# Patient Record
Sex: FEMALE | Race: WHITE
[De-identification: names, ages, dates, MRNs, and addresses within clinical notes are randomized per-mention and may not be internally consistent; named-entity substitution may affect disease eponyms.]

---

## 2018-07-29 ENCOUNTER — HOSPITAL ENCOUNTER (INPATIENT)
Dept: HOSPITAL 12 - SRC | Age: 38
LOS: 6 days | Discharge: HOME | DRG: 895 | End: 2018-08-04
Attending: INTERNAL MEDICINE | Admitting: INTERNAL MEDICINE
Payer: COMMERCIAL

## 2018-07-29 VITALS — HEIGHT: 70 IN | WEIGHT: 208 LBS | BODY MASS INDEX: 29.78 KG/M2

## 2018-07-29 DIAGNOSIS — F13.230: Primary | ICD-10-CM

## 2018-07-29 DIAGNOSIS — Z91.19: ICD-10-CM

## 2018-07-29 DIAGNOSIS — F33.1: ICD-10-CM

## 2018-07-29 DIAGNOSIS — Z91.5: ICD-10-CM

## 2018-07-29 DIAGNOSIS — F10.230: ICD-10-CM

## 2018-07-29 DIAGNOSIS — Z82.49: ICD-10-CM

## 2018-07-29 DIAGNOSIS — F17.210: ICD-10-CM
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
## 2018-07-29 LAB
AMPHETAMINES UR QL SCN>1000 NG/ML: NEGATIVE
BARBITURATES UR QL SCN: NEGATIVE
COCAINE UR QL SCN: NEGATIVE
HCG UR QL: NEGATIVE
OPIATES UR QL SCN: NEGATIVE
PCP UR QL SCN>25 NG/ML: NEGATIVE
THC UR QL SCN>50 NG/ML: NEGATIVE

## 2018-07-29 PROCEDURE — HZ2ZZZZ DETOXIFICATION SERVICES FOR SUBSTANCE ABUSE TREATMENT: ICD-10-PCS | Performed by: INTERNAL MEDICINE

## 2018-07-29 PROCEDURE — A4663 DIALYSIS BLOOD PRESSURE CUFF: HCPCS

## 2018-07-29 PROCEDURE — G0480 DRUG TEST DEF 1-7 CLASSES: HCPCS

## 2018-07-29 NOTE — NUR
INTAKE ASSESSMENT

Pt presented as anxious,restless,emotionally disturbed,c/o "felling unwell"; reported that 
she has been drinking beer since she was 11 years old.She started drinking occasionally in 
small quantity and then over the years she began drinking more frequently in greater 
quantity and now she is drinking 8 bottles of beer,12 ozs each on a daily basis;last drink 
was at 1900 today.Pt also reports taking Xanax on a daily basis.She initially began taking 
Xanax 5 years ago when she was 32 years old,starting with smaller doses.She is currently 
taking Xanax 2.5mg on a daily basis,last taken 1 mg at 2000 today.She came in here today 
because she wants to stop drinking beer and taking Xanax.Pt is A/O X 4,speech is 
clear,ambulates with a steady gait and is in a stable condition to proceed to [facility].

B/P=112/66,T=97.6,P-106,R=18,O2 SAT=95% ON RA.

-------------------------------------------------------------------------------

Addendum: 07/30/18 at 0953 by MARINANE JACKSON RN

-------------------------------------------------------------------------------

This morning I asked the patient how long she has been drinking/using at the current rate 
and she stated as follows:



1. ETOH--8 12oz bottles of beer daily for the past six months. As stated above, she began 
drinking at 11, it increased over the years, she was sober for two years from the ages of 
[age] (without treatment), and 6 months ago progressed to the current rate. Last drink  was 
2 bottles of beer on 7/29 at 1900

2. Xanax--2.5 mg daily for the past six months. This medication is not prescribed and she 
has never had a prescription for Xanax. She states that she first used Xanax approximately 2 
years ago, starting at 1 mg/day, and it progressed to 2.5 mg/day six months ago. She has 
taken at least 1 mg of Xanax everyday for 2 years. As stated above, last use was 1 mg on 
7/29 at 2000. She began purchasing Xanax to help with her anxiety and panic attacks.

## 2018-07-29 NOTE — NUR
ADMISSION NOTE

HT=5 FEET, 10 INCHES.

XI=799 POUNDS.

B/P=112/66,T=97.8,P-106,R=18,O2 SAT=95% ON ROOM AIR.

CIWA=7

NKA



Admitting 36 y/o female to [facility] for medically supervised withdrawals from ETOH/BENZO.Pt 
presented as anxious,restless,emotionally disturbed,c/o "felling unwell"; reported that she 
has been drinking beer since she was 11 years old.She started drinking occasionally in small 
quantity and then over the years she began drinking more frequently in greater quantity and 
now she is drinking 8 bottles of beer,12 ozs each on a daily basis;last drink was at 1900 
today.Pt also reports taking Xanax on a daily basis.She initially began taking Xanax 5 years 
ago when she was 32 years old,starting with smaller doses.She is currently taking Xanax 
2.5mg on a daily basis,last taken 1 mg at 2000 today.

Pt stated that she has Hx of Major Depression and Anxiety.Pt is a  by profession and 
specializes in divorce cases,stated that her anxiety is due to her work.Pt reports that she 
has tried taking Prozac,Zoloft and Lexapro for her depression,stated" but nothing seems to 
work".Pt also has hx of G.I. 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 
and difficulty breathing when she first wakes up.Pt denies any hx of Asthma/SOB.No seizure 
hx noted.Pt is c/o headache 5/10 due to "HANG OVER" from drinking beer.

Skin is intact,warm and dry to touch,breathing is even and non labored,no s/s of respiratory 
distress noted, abdomen is soft and palpable with bowel sounds present in all four 
quadrants,no c/o nausea,vomiting of diarrhea noted.Pt had a bowel movement today.

Pt denies any SI/HI/AVH.Pt oriented to room and unit,care plan and safety checks 
initiated,education material provided.MD notified,orders obtained.



Pt is A/O X 4,lives in [place] by herself,stated that her friend helped her with this 
admission.She has never been in Detox treatment before,this is her first time ever.She does 
not have  PCP. Pt stated she was sober for 2 years between the ages of 21 to 23 from 
drinking alcohol and detoxed on her own.She stated that she has never stopped taking 
Xanax,since she started taking it when she was 32 years old;because she cannot survive 
without taking Xanax due to having high level of anxiety and has panic attacks when she does 
not take it.



PSYCH HX :-

Pt has history of suicide attempt x 2 by overdosing on alcohol and medications,in 2010.She 
was hospitalized  in [facility] x 2 in 2010 and placed on 5150 foe danger to 
self.



SUBS.ABUSE HX:

1) BEERS - Pt reports drinking 8 bottles of beer 12 ozs each on a daily basis.

    Last drink was 2 bottles at 1900 today.

2) XANAX - Pt reports taking 2.5 mg of Xanax daily.

   Last taken 1 mg at 2000 today.



WITHDRAWAL SYMPTOMS:-

Anxiety,restlessness,body twitching,tremors,hallucinations,nausea and insomnia.



NO PRIOR DETOX HX.



HOME MEDICATIONS

XANAX-2.5 MG DAILY FOR ANXIETY.

MOTRIN-400 MG AS NEEDED FOR PAIN.

IMODIUM- AS NEEDED FOR DIARRHEA.

## 2018-07-29 NOTE — NUR
PRN MEDICATIONS

PRN MOTRIN GIVEN AS ORDERED FOR C/O HEADACHE 5/10.PRN BENADRYL GIVEN FOR C/O INSOMNIA.WILL 
MONITOR FOR EFFECTIVENESS.

## 2018-07-30 VITALS — DIASTOLIC BLOOD PRESSURE: 63 MMHG | SYSTOLIC BLOOD PRESSURE: 95 MMHG

## 2018-07-30 VITALS — SYSTOLIC BLOOD PRESSURE: 109 MMHG | DIASTOLIC BLOOD PRESSURE: 71 MMHG

## 2018-07-30 VITALS — DIASTOLIC BLOOD PRESSURE: 91 MMHG | SYSTOLIC BLOOD PRESSURE: 132 MMHG

## 2018-07-30 VITALS — SYSTOLIC BLOOD PRESSURE: 132 MMHG | DIASTOLIC BLOOD PRESSURE: 90 MMHG

## 2018-07-30 VITALS — SYSTOLIC BLOOD PRESSURE: 119 MMHG | DIASTOLIC BLOOD PRESSURE: 88 MMHG

## 2018-07-30 VITALS — DIASTOLIC BLOOD PRESSURE: 61 MMHG | SYSTOLIC BLOOD PRESSURE: 98 MMHG

## 2018-07-30 LAB
ALP SERPL-CCNC: 53 U/L (ref 50–136)
ALT SERPL W/O P-5'-P-CCNC: 26 U/L (ref 14–59)
AMYLASE SERPL-CCNC: 34 U/L (ref 25–115)
AST SERPL-CCNC: 14 U/L (ref 15–37)
BASOPHILS # BLD AUTO: 0 K/UL (ref 0–8)
BASOPHILS NFR BLD AUTO: 0.4 % (ref 0–2)
BILIRUB SERPL-MCNC: 0.2 MG/DL (ref 0.2–1)
BUN SERPL-MCNC: 11 MG/DL (ref 7–18)
CHLORIDE SERPL-SCNC: 105 MMOL/L (ref 98–107)
CO2 SERPL-SCNC: 28 MMOL/L (ref 21–32)
CREAT SERPL-MCNC: 0.8 MG/DL (ref 0.6–1.3)
EOSINOPHIL # BLD AUTO: 0.3 K/UL (ref 0–0.7)
EOSINOPHIL NFR BLD AUTO: 3.7 % (ref 0–7)
ETHANOL SERPL-MCNC: 101 MG/DL (ref 0–0)
GLUCOSE SERPL-MCNC: 85 MG/DL (ref 74–106)
HCT VFR BLD AUTO: 42.9 % (ref 31.2–41.9)
HGB BLD-MCNC: 15 G/DL (ref 10.9–14.3)
LIPASE SERPL-CCNC: 135 U/L (ref 73–393)
LYMPHOCYTES # BLD AUTO: 2.3 K/UL (ref 20–40)
LYMPHOCYTES NFR BLD AUTO: 27.2 % (ref 20.5–51.5)
MAGNESIUM SERPL-MCNC: 1.7 MG/DL (ref 1.8–2.4)
MCH RBC QN AUTO: 33.2 UUG (ref 24.7–32.8)
MCHC RBC AUTO-ENTMCNC: 35 G/DL (ref 32.3–35.6)
MCV RBC AUTO: 95.3 FL (ref 75.5–95.3)
MONOCYTES # BLD AUTO: 0.5 K/UL (ref 2–10)
MONOCYTES NFR BLD AUTO: 6.3 % (ref 0–11)
NEUTROPHILS # BLD AUTO: 5.3 K/UL (ref 1.8–8.9)
NEUTROPHILS NFR BLD AUTO: 62.4 % (ref 38.5–71.5)
PLATELET # BLD AUTO: 276 K/UL (ref 179–408)
POTASSIUM SERPL-SCNC: 3.9 MMOL/L (ref 3.5–5.1)
RBC # BLD AUTO: 4.51 MIL/UL (ref 3.63–4.92)
WBC # BLD AUTO: 8.5 K/UL (ref 3.8–11.8)
WS STN SPEC: 6.8 G/DL (ref 6.4–8.2)

## 2018-07-30 PROCEDURE — HZ41ZZZ GROUP COUNSELING FOR SUBSTANCE ABUSE TREATMENT, BEHAVIORAL: ICD-10-PCS

## 2018-07-30 RX ADMIN — DIAZEPAM SCH MG: 10 TABLET ORAL at 15:07

## 2018-07-30 RX ADMIN — Medication SCH MG: at 16:28

## 2018-07-30 RX ADMIN — DIAZEPAM SCH MG: 10 TABLET ORAL at 21:12

## 2018-07-30 RX ADMIN — TRAZODONE HYDROCHLORIDE SCH MG: 50 TABLET ORAL at 21:12

## 2018-07-30 RX ADMIN — FOLIC ACID SCH MG: 1 TABLET ORAL at 08:52

## 2018-07-30 RX ADMIN — DIAZEPAM SCH MG: 10 TABLET ORAL at 16:28

## 2018-07-30 RX ADMIN — THERA TABS SCH UDTAB: TAB at 08:52

## 2018-07-30 RX ADMIN — Medication SCH MG: at 08:52

## 2018-07-30 NOTE — NUR
START OF SHIFT NOTE

Received report from night nurse, 37 year old female admitted for ETOH/Xanax withdrawal. 
Patient currently not on any taper but PRN'S available for s/s of withdrawal. Per 
endorsement patient received PRN Motrin, Benadryl, Ativan effective per night nurse, Last 
CIWA -7, slept for 7 hours. Received patient alert awake poor eye contact, appears flushed 
and bilateral hand tremors, sweats, anxious, agitated, restless, easily distracted, unkempt 
room. Educated patient regarding the importance of attending groups activities. Patient 
verbalized understanding. All safety measures in place, Call light within reach. Will cont 
to monitor.

## 2018-07-30 NOTE — NUR
START OF SHIFT

Pt is a 38 y/o female admitted  to [facility] for medically supervised withdrawals from 
ETOH/BENZO.Pt received in room,c/o feeling  anxious,restless and  not feeling well. Pt also 
c/o intermittent nausea and constipation but does not want to take any medications at this 
time,stated I will go to smoke and will be okay.Pt has been started on a Valium taper and 
has been tolerating well.Wants to wait for scheduled medications.No A/R noted. No PRN meds 
given during the day. Last CIWA 12. All safety measures in place.  Bed is locked in  lowest 
position with side rails x2 up for safety. Call light is  within reach, will continue to 
monitor for safety.

## 2018-07-30 NOTE — NUR
PRN REASSESSMENT.

Pt is calm and resting in bed with eyes closed;breathing is even and non labored,no s/s of 
distress noted;all safety measures in place,call light is within reach,will continue to 
monitor.

## 2018-07-30 NOTE — NUR
COWS=9.

Pt is c/o feeling tremulous,anxious,restless,c/o having mild nausea but no vomiting 
reported;refused to take zofran when offered.Pt  feels like she does not belong in 
here.Emotional support and positive coping skills encouraged.Pt stated that her job is very 
stressful and gives her a lot of anxiety.Pt plans to join a gym and  go to an out patient 
clinic after discharge;will continue to monitor.

## 2018-07-30 NOTE — NUR
CIWA ASSESSMENT

CIWA score noted-10, Patient presented with anxiety, agitation, restless, bilateral hand 
tremors,encourage patient to use non pharmacological intervention. Will cont to monitor.

## 2018-07-30 NOTE — NUR
END OF SHIFT NOTE

Gave report to night nurse, 37 year old female admitted for ETOH/Xanax withdrawal and 
continues and started Valium taper this afternoon tolerated well. Patient continues to 
exhibit anxiety, agitation, restless, anhedonia, sweats, sad facial expression, worried, 
fine bilateral hand tremors noted, depressed. Patient received scheduled medications and 
patient was seen by psychiatrist with new order to give Prozac 20mg PO medication 
administered as ordered patient tolerated well.  During shift patient received scheduled 
medications and PPD was given on LFA no s/s of bleeding no swelling noted. Encourage patient 
to attend groups activities to learn new coping skills. Most recent CIWA score-12. All 
safety measures in place, call light within reach. Patient endorse to night nurse in stable 
condition.

## 2018-07-30 NOTE — NUR
CIWA deferred due to Pt being asleep;no s/s of distress noted,all safety measures in 
place,will continue to monitor.

## 2018-07-30 NOTE — NUR
END OF SHIFT

Pt is a 36 y/o female admitted  to [facility] for medically supervised withdrawals from 
ETOH/BENZO.Pt presented as feeling anxious,restless,emotionally disturbed,c/o "felling 
unwell",began crying during interview,stated that she is having and anxiety attack; also c/o 
headache due to hangover and cannot sleep at night.Pt was given Ativan 2 mg PO as 
ordered,PRN Motrin for headache and Benadryl for insomnia.Medications were effective in 
controlling symptoms.Pt slept 7 hrs,fluid intake was 1000 mls,voided x 2.Last CIWA was 7. 
All safety measures in place.  Bed is locked in  lowest position with side rails x2 up for 
safety. Call light within reach, will endorse care to on coming shift nurse.

## 2018-07-30 NOTE — NUR
CIWA ASSESSMENT

CIWA score 12, patient resting in her room, reported  anxiety, agitation, restless, pines 
and needles, sweats. Will cont to monitor.

## 2018-07-30 NOTE — NUR
CIWA ASSESSMENT

CIWA score-13, Patient continues to exhibits s/s of withdrawal anxiety, agitation, restless, 
bilateral hand tremors, anhedonia, Patient scheduled to start for Valium taper. Will cont to 
monitor.

## 2018-07-31 VITALS — SYSTOLIC BLOOD PRESSURE: 107 MMHG | DIASTOLIC BLOOD PRESSURE: 71 MMHG

## 2018-07-31 VITALS — DIASTOLIC BLOOD PRESSURE: 68 MMHG | SYSTOLIC BLOOD PRESSURE: 99 MMHG

## 2018-07-31 VITALS — SYSTOLIC BLOOD PRESSURE: 150 MMHG | DIASTOLIC BLOOD PRESSURE: 86 MMHG

## 2018-07-31 VITALS — SYSTOLIC BLOOD PRESSURE: 130 MMHG | DIASTOLIC BLOOD PRESSURE: 94 MMHG

## 2018-07-31 VITALS — SYSTOLIC BLOOD PRESSURE: 126 MMHG | DIASTOLIC BLOOD PRESSURE: 88 MMHG

## 2018-07-31 VITALS — DIASTOLIC BLOOD PRESSURE: 66 MMHG | SYSTOLIC BLOOD PRESSURE: 108 MMHG

## 2018-07-31 PROCEDURE — HZ31ZZZ INDIVIDUAL COUNSELING FOR SUBSTANCE ABUSE TREATMENT, BEHAVIORAL: ICD-10-PCS

## 2018-07-31 RX ADMIN — DIAZEPAM SCH MG: 10 TABLET ORAL at 08:12

## 2018-07-31 RX ADMIN — CLONIDINE HYDROCHLORIDE PRN MG: 0.1 TABLET ORAL at 20:38

## 2018-07-31 RX ADMIN — DIAZEPAM SCH MG: 10 TABLET ORAL at 20:38

## 2018-07-31 RX ADMIN — TRAZODONE HYDROCHLORIDE SCH MG: 50 TABLET ORAL at 20:38

## 2018-07-31 RX ADMIN — DICYCLOMINE HYDROCHLORIDE PRN MG: 20 TABLET ORAL at 13:47

## 2018-07-31 RX ADMIN — Medication SCH MG: at 08:12

## 2018-07-31 RX ADMIN — THERA TABS SCH UDTAB: TAB at 08:12

## 2018-07-31 RX ADMIN — DIAZEPAM SCH MG: 10 TABLET ORAL at 14:00

## 2018-07-31 RX ADMIN — FOLIC ACID SCH MG: 1 TABLET ORAL at 08:12

## 2018-07-31 NOTE — NUR
CIWA 14



Pt presents with anxiety, restlessness, racing thoughts, elevated BP, elevated HR, tremors, 
sweats, flushed skin, agitation, difficulty concentrating, disheveled appearance, difficulty 
falling and staying asleep, and is fidgety. Pt states, "My anxiety is so high right now, I 
need to go smoke right now."

## 2018-07-31 NOTE — NUR
PRN CLONIDINE ADMINISTRATION



Pt presents with anxiety, agitation, sweats and /88 and HR 92. Safety measures in 
place. Call light within reach. Will continue to monitor.

## 2018-07-31 NOTE — NUR
END OF SHIFT NOTE

Gave report to night nurse, patient continues with Valium taper tolerating well. Patient 
presented with anxiety, agitation, restless, anhedonia, stomach cramps, bilateral hand 
tremors. Patient noted with flat affect. Patient was given schedule medications along with 
PRN Bentyl for stomach cramps noted to be effective. Encourage pt to attend group activities 
to learn new coping skills. Encourage PO fluids as tolerated. All safety measures in place, 
Call light within reach. Patient endorsed to night nurse in stable condition.

## 2018-07-31 NOTE — NUR
CIWA ASSESSMENT

CIWA score noted 14, Patient continues to exhibit s/s of  with anxiety, agitation, restless, 
 sweats, light headed stomach cramps. Will cont to monitor.

## 2018-07-31 NOTE — NUR
START OF SHIFT NOTE

Received report from night nurse, 37 year old female admitted for ETOH/Xanax withdrawal. 
Patient continues on Valium taper tolerating well. Per endorsement patient did not receive 
any PRN , Last CIWA -9, slept for 7 hours. Received patient alert awake poor eye contact, 
appears flushed and bilateral hand tremors, sweats, anxious, agitated, restless. Educated 
patient regarding the importance of attending groups activities. Patient verbalized 
understanding. All safety measures in place, Call light within reach. Will cont to monitor.

## 2018-07-31 NOTE — NUR
CIWA DEFERRED

CIWA deferred due to Pt being asleep;no s/s of distress noted,all safety measures in 
place,V/S are stable;will continue to monitor.

## 2018-07-31 NOTE — NUR
START OF SHIFT



Pt is a 38 y/o female admitted on 07/29/18 for ETOH and benzo withdrawal. Pt is on a 5 day 
Valium taper that started on 07/30/18, tolerating well. Last CIWA 13 and PRN Bentyl 
administered during day shift. Upon assessment pt presents with anxiety, restlessness, 
racing thoughts, elevated BP, elevated HR, tremors, sweats, flushed skin, agitation, 
difficulty concentrating, disheveled appearance, difficulty falling and staying asleep, and 
is fidgety. Medications due. Safety measures in place. Call light within reach. Will 
continue to monitor.

## 2018-07-31 NOTE — NUR
CIWA ASSESSMENT

CIWA score noted 12, Patient presented with anxiety, agitation, restless, anhedonia, sweats, 
light headed. patient due for schedule medications. Will cont to monitor.

## 2018-07-31 NOTE — NUR
PRN BENTYL

Patient reported stomach cramps 5/10. PRN Bentyl 20mg PO administered as ordered. Will cont 
to monitor and reassess.

## 2018-07-31 NOTE — NUR
PRN CLONIDINE REASSESSMENT



Pt has improvement in anxiety, sweats and agitation. Pt still has anxiety and restlessness, 
but has overall improvement in symptoms. Safety measures in place. Call light within reach. 
Will continue to monitor.

## 2018-08-01 VITALS — SYSTOLIC BLOOD PRESSURE: 101 MMHG | DIASTOLIC BLOOD PRESSURE: 74 MMHG

## 2018-08-01 VITALS — SYSTOLIC BLOOD PRESSURE: 126 MMHG | DIASTOLIC BLOOD PRESSURE: 79 MMHG

## 2018-08-01 VITALS — SYSTOLIC BLOOD PRESSURE: 138 MMHG | DIASTOLIC BLOOD PRESSURE: 97 MMHG

## 2018-08-01 VITALS — SYSTOLIC BLOOD PRESSURE: 92 MMHG | DIASTOLIC BLOOD PRESSURE: 66 MMHG

## 2018-08-01 VITALS — DIASTOLIC BLOOD PRESSURE: 74 MMHG | SYSTOLIC BLOOD PRESSURE: 110 MMHG

## 2018-08-01 RX ADMIN — Medication SCH MG: at 08:37

## 2018-08-01 RX ADMIN — DIAZEPAM SCH MG: 5 TABLET ORAL at 21:07

## 2018-08-01 RX ADMIN — FOLIC ACID SCH MG: 1 TABLET ORAL at 08:37

## 2018-08-01 RX ADMIN — THERA TABS SCH UDTAB: TAB at 08:37

## 2018-08-01 RX ADMIN — TRAZODONE HYDROCHLORIDE SCH MG: 50 TABLET ORAL at 21:07

## 2018-08-01 RX ADMIN — DIAZEPAM SCH MG: 5 TABLET ORAL at 08:38

## 2018-08-01 RX ADMIN — DIAZEPAM SCH MG: 5 TABLET ORAL at 16:54

## 2018-08-01 RX ADMIN — ALUMINUM HYDROXIDE, MAGNESIUM HYDROXIDE, AND SIMETHICONE PRN ML: 200; 200; 20 SUSPENSION ORAL at 15:18

## 2018-08-01 RX ADMIN — DIAZEPAM SCH MG: 5 TABLET ORAL at 12:33

## 2018-08-01 NOTE — NUR
CIWA ASSESSMENT

continues to exhibit the following s/sx of withdrawal:dyspepsia, tremors,  anxiety, 
agitation, fidgety, restless, depression, difficulty concentrating, generalized discomfort, 
diaphoresis, increased emotional amplitude, intermittent perspiration, and sensitivity to 
sound. Patient with CIWA score of: 17, Dr. Hernandez aware, no new others, will continue to 
monitor.

## 2018-08-01 NOTE — NUR
Clinical Therapy Note:

Met with pt. briefly after report in IDT that pt. was refusing to call her employer and 
could be faced with abandoning her position as an . Patient acknowledged that she " 
really does not want to do it but realizes she has to call her employer." Jadyn and this 
writer spoke with the client briefly and Jadyn agreed to facilitate this this evening. 
Client was agreeable. Kathryn MCCLURE was also made aware of the situation.

## 2018-08-01 NOTE — NUR
CIWA DEFERRED AND VITALS REFUSED



Pt laying in bed with eyes closed, CIWA deferred, to be assessed when pt is awake per 
orders. Vitals refused. Respirations even and unlabored. Safety measures in place. Call 
light within reach. Will continue to monitor.

## 2018-08-01 NOTE — NUR
CIWA ASSESSMENT:

CIWA=14 at 2000: Patient presented with moderate withdrawal symptoms of anxiety, agitation, 
nervousness, irritability, flushed face, sweating, tremors, yawning, piloerection, 
restlessness, and fatigue.  The patient is alert and oriented x4, ambulatory with steady 
gait, cooperative with soft, clear speech.   Affect irritable and anxious.  Mood labile.

## 2018-08-01 NOTE — NUR
CIWA ASSESSMENT

Patient in room, noted with flat affect, depressed and anxious mood. Patient presenting 
with: tremors,  anxiety, agitation, fidgety, restless, depression, diaphoresis, difficulty 
concentrating, generalized discomfort, increased emotional amplitude, intermittent 
perspiration, and sensitivity to sound. Patient with CIWA score of: 17, Dr. Hernandez aware, no 
new others, patient continues on 5 day Valium taper as ordered, encouraged patient to 
increase PO fluid intake as tolerated. Will continue to monitor.

## 2018-08-01 NOTE — NUR
CIWA 12



Pt presents with anxiety, restlessness, intermittent tremors, sweats, flushed skin, 
agitation, disheveled appearance, difficulty falling and staying asleep. Pt states, "I feel 
wide awake."

## 2018-08-01 NOTE — NUR
END OF SHIFT



Pt is a 38 y/o female admitted on 07/29/18 for ETOH and benzo withdrawal. Pt is on a 5 day 
Valium taper that started on 07/30/18, tolerating well. Pt presented with anxiety, 
restlessness, racing thoughts, elevated BP, elevated HR, tremors, sweats, flushed skin, 
agitation, difficulty concentrating, disheveled appearance, difficulty falling and staying 
asleep, and was fidgety. Scheduled medications and PRN Clonidine and Benadryl administered, 
effective in S/S of withdrawal AEB CIWA 14 lowered to CIWA 12 during shift.  Pt slept 7 
hours. Intake 2000 ml, void x 5, stool x 1. Safety measures in place. Call light within 
reach. Pts needs have been met. Endorsed to day shift nurse.

## 2018-08-01 NOTE — NUR
BEGINNING OF SHIFT

Patient endorsement report received from night shift nurse, all pertinent information was 
discussed. Patient is a 37 year old female admitted with admitting Dx: Etoh/BZO withdrawal. 
patient remains under close observation, will continue to monitor patient closely throughout 
out shift, will monitor s/sx of withdrawal,  currently with ongoing 5 day Valium taper as 
ordered. patient is scheduled to begin day 3 of taper. Patient received PRN: clonidine, and 
Benadryl as per night shift medications effective. patient slept for 7 hours. Patient with 
last CIWA score of: 12. Patient received awake, alert and oriented x4, educated regarding 
plan of care for the day and medication regimen. safety measures are in place. call light 
with in reach. will continue to monitor closely.

## 2018-08-01 NOTE — NUR
START OF SHIFT NOTE:

Endorsed patient is a 37 year old female admitted for Benzodiazepines(Xanax) and 
Alcohol(Beer) withdrawal.   She is on  ordered 5 day Valium taper.  Patent reports NKA, is 
on Full Code, Regular Diet, is on Fall and Seizures Precautions.  Patient denies History of 
withdrawal-induced seizures.  Patient denies SI/HI.  Patient is alert and oriented x4 with 
stable gait.  She is appears sad and worry with poor eye contact,  anxious mood and flat 
affect.  Patient 's c/o feelings of easily overwhelmed, increased anxiety, and irritability. 
 She expressed her feelings as," I am easily irritated, and have restlessness and fatigue".  
Emotional support and reassuring provided.  Encouraged expressed her feelings.  Last CIWA=17 
@1645 per outgoing day shift nurse report:  Patient  presented with moderate withdrawal 
symptoms of anxiety, agitation, nervousness, tremors, sweating, generalized body aches, 
myalgia, heartburn, headache, restlessness, and fatigue.  PRN  Mylanta administrated for 
heartburn at 1518, and was effective, per day shift nurse report.  Patient remains compliant 
with treatment, medications and diet regime.  Encouraged to fluid intake as tolerated.  
Encouraged to attended groups activities.  All needs met.  Safety measures in place: Call 
light within reach, bed is locked in lowest position, padded bed rails up bilaterally.  
Patient endorsed by day shift nurse.  Report received.  Will continue to monitor closely.

## 2018-08-01 NOTE — NUR
RE-ASSESSMENT

Patient is sleeping.  RR: 16.  Respirations even and unlabored.  Benadryl 50 mg PO 
administrated PRN for insomnia at 2107 was effective.  Safe and calm environment with 
minimized noises was provided.  All needs met.  Safety measures: Call light within reach, 
bed in lowest position locked, padded rails up bilaterally.  Will continue to monitor 
closely.

## 2018-08-01 NOTE — NUR
PRN  BENADRYL 50 MG 1 CAP PO ADMINISTRATION

Patient c/o insomnia and asked aid.   Benadryl 50 mg PO administrated PRN with full glass of 
water as ordered.  Patient tolerated well.   All needs met.  Safe and calm environment with 
minimized noises was provided.  Safety measures on place.  Call light within reach, bed in 
lowest position locked, padded rails up bilaterally.  Will continue to monitor closely.

## 2018-08-01 NOTE — NUR
PRN BENADRYL REASSESSMENT



Pt laying in bed with eyes closed, medication noted effective. Respirations even and 
unlabored. Safety measures in place. Call light within reach. Will continue to monitor.

## 2018-08-01 NOTE — NUR
END OF SHIFT

Patient monitored closely during shift, continues with ongoing 5 day Valium taper as 
ordered, currently on day 3 of taper. During shift patient presented with the following s/sx 
of withdrawal: dyspepsia,  tremors,  anxiety, agitation, fidgety, restless, depression, 
difficulty concentrating, generalized discomfort, diaphoresis, increased emotional 
amplitude, intermittent perspiration, and sensitivity to sound. Patient with last CIWA score 
of: 17. MD was made aware of patients elevated CIWA scores.  Received PRN: Mylanta during 
shift, medication was effective one hour post administration. Patient was also noted 
Disheveled, unkempt, unwashed/uncombed hair. Patient encouraged to maintain personal area 
and self groom. Patients affect was noted flat and labile. Noted with an anxious/depressed 
mood. Patient easily overwhelmed. Encouraged to develop coping skills and utilization of non 
pharmacological interventions. Also encouraged to practice self soothing techniques such as 
progressive muscle relaxation. Patient encouraged to attend group therapies/sessions to 
learn new coping skills to prevent relapse, noted attending and participating denies SI/HI. 
Encouraged increase PO fluid intake as tolerated. Patients safety measures are in place.

## 2018-08-01 NOTE — NUR
CIWA ASSESSMENT

Patient continues exhibiting the following s/sx of withdrawal: tremors,  anxiety, agitation, 
fidgety, restless, depression, difficulty concentrating, generalized discomfort, 
diaphoresis, increased emotional amplitude, intermittent perspiration, and sensitivity to 
sound. Patient with CIWA score of: 17, Dr. Hernandez aware, no new others, will continue to 
monitor.

## 2018-08-01 NOTE — NUR
PRN MYLANTA

Patient reported heartburn, administered mylanta as ordered, will monitor effectiveness of 
medication.

## 2018-08-02 VITALS — SYSTOLIC BLOOD PRESSURE: 97 MMHG | DIASTOLIC BLOOD PRESSURE: 60 MMHG

## 2018-08-02 VITALS — DIASTOLIC BLOOD PRESSURE: 79 MMHG | SYSTOLIC BLOOD PRESSURE: 117 MMHG

## 2018-08-02 VITALS — SYSTOLIC BLOOD PRESSURE: 110 MMHG | DIASTOLIC BLOOD PRESSURE: 59 MMHG

## 2018-08-02 VITALS — DIASTOLIC BLOOD PRESSURE: 85 MMHG | SYSTOLIC BLOOD PRESSURE: 136 MMHG

## 2018-08-02 VITALS — SYSTOLIC BLOOD PRESSURE: 97 MMHG | DIASTOLIC BLOOD PRESSURE: 67 MMHG

## 2018-08-02 VITALS — SYSTOLIC BLOOD PRESSURE: 115 MMHG | DIASTOLIC BLOOD PRESSURE: 79 MMHG

## 2018-08-02 RX ADMIN — PANTOPRAZOLE SODIUM SCH MG: 40 TABLET, DELAYED RELEASE ORAL at 14:49

## 2018-08-02 RX ADMIN — TRAZODONE HYDROCHLORIDE SCH MG: 50 TABLET ORAL at 21:04

## 2018-08-02 RX ADMIN — Medication SCH MG: at 08:39

## 2018-08-02 RX ADMIN — DIAZEPAM SCH MG: 5 TABLET ORAL at 14:49

## 2018-08-02 RX ADMIN — DIAZEPAM SCH MG: 5 TABLET ORAL at 08:40

## 2018-08-02 RX ADMIN — DIAZEPAM SCH MG: 5 TABLET ORAL at 21:04

## 2018-08-02 RX ADMIN — CLONIDINE HYDROCHLORIDE PRN MG: 0.1 TABLET ORAL at 01:20

## 2018-08-02 RX ADMIN — THERA TABS SCH UDTAB: TAB at 08:40

## 2018-08-02 RX ADMIN — FOLIC ACID SCH MG: 1 TABLET ORAL at 08:40

## 2018-08-02 NOTE — NUR
START OF SHIFT NOTE:

37 year old patient is alert and oriented x4, ambulatory with steady gait, soft and clear 
speech.  The patient  appears with anxious mood and labile affect.  Reassuring provided.  
Encouraged to express her feelings.  The most recent  CIWA=11 at 1600, per outgoing day 
shift nurse report:  The patient presented with moderate withdrawal symptoms of anxiety, 
agitation, nervousness, irritability, sweating, abdominal cramps, nausea, restlessness, 
tremors, and fatigue.  Respirations are even and unlabored.   Patient denies SOB, chest 
pain, and cough.  Skin is intact, warm, and dry to touch.  The patient tolerated well.  The 
patient remains compliant with treatment, medications, and diet regime.  Encouraged to 
fluids intake as tolerated.  Safe and calm environment with minimized noises was provided.  
All needs met.  Safety measures in place: Call light within reach, bed is locked in lowest 
position, and padded bed rails up bilaterally.  Patient endorsed by outgoing day shift 
nurse.  Will continue to monitor closely.

## 2018-08-02 NOTE — NUR
MD COMMUNICATION

Patient reported to nurse that she has been experiencing an increase in a nausea, patient 
reports in the past taking many NSAID's for endometriosis pain, and believes that is why her 
stomach aches and has an increase in heart burn. Notified Dr. Hernandez, per MD new orders 
obtained " Protonix 40mg PO QD in am for acid reflux, first dose now" MD unable to enter 
order, order was read back and verified, noted and carried out. Patient was offered Mylanta 
and declined report it does not help her. Will continue to monitor.

## 2018-08-02 NOTE — NUR
CIWA ASSESSMENT

Still noted with the following s/sx : tremors,  diaphoretic, anxious, agitated, restless, 
fidgety, clammy skin,  difficulty concentrating, generalized discomfort, increased emotional 
amplitude, intermittent perspiration,with CIWA score of: 11. Will continue to monitor.

## 2018-08-02 NOTE — NUR
PRN CLONIDINE 0.1 MG PO ADMINISTRATION.

PRN Clonidine 0.1 mg  PO administrated for anxiety at 0120 with full glass of water, as 
ordered.  Patient tolerated well.  Safe and calm environment with minimized noises was 
provided.  All needs met.  Safety measures: Call light within reach, bed locked in the 
lowest position, and padded rails up x2.  Will continue to monitor closely.

## 2018-08-02 NOTE — NUR
[facility] ASSESSMENT:

CIWA=11 at 2000: The patient presented with  anxiety, agitation, obv. irritability, nasal 
congestion, stomach cramps, very mild pins and needles sensations, dilated pupils, 
nervousness, tremors, sweating,  yawning, restlessness, and fatigue.  Patient appears with 
anxious mood and flat affect.  Emotional support  and reassuring provided.  Encouraged to 
expresses his  feelings.  Relaxation Techniques: deep breathing exercises, guided imagery, 
and  visualization.  Safe and calm environment with minimized noises was provided.  All 
needs met.  Safety measures: Call light within reach, bed is locked in lowest position, and 
padded bed rails up x2.

## 2018-08-02 NOTE — NUR
CIWA ASSESSMENT

Patient continues to exhibit the following s/sx of withdrwal: tremors,  diaphoretic, 
anxious, agitated, restless, fidgety, clammy skin,  difficulty concentrating, generalized 
discomfort, increased emotional amplitude, intermittent perspiration,with CIWA score of: 11. 
Will continue to monitor.

## 2018-08-02 NOTE — NUR
BEGINNING OF SHIFT

Patient continues under close observation. Report received from night shift nurse, all 
pertinent information was discussed. Patient continues on 5 day Valium taper as ordered, 
patient is scheduled to begin day 4 of taper. Patient received PRN: clonidine, and Benadryl 
as per night shift medications effective. patient slept for 4 hours, per night shift nurse, 
patient went to sleep  late due to was watching a movie. Patient with last CIWA score of: 
10. Patient received awake, alert and oriented x4, educated regarding plan of care for the 
day and medication regimen. safety measures are in place. call light with in reach. will 
continue to monitor closely.

## 2018-08-02 NOTE — NUR
END OF SHIFT NOTE:

37 year old female presented for withdrawal from  Benzodiazepines(Xanax) and Alcohol (Beer). 
 Patient continues ordered 5 day Valium taper which tolerated well.  Patient remains 
compliant with treatment, medications, and diet regime. The patient reports Allergy to 
Barbiturates, cat and dog dander, and  diphenhydramine.  Patient is alert and oriented 
x4,cooperative, with steady gait , and with soft, clear speech.  The patient appears 
irritable, worry with poor eye contact.  Mood anxious, labile affect.  Encouraged expresses 
her feeling, and reassuring provided.  Education provided to use of Relaxation Techniques: 
Deep breathing exercises, guided imagery, and  visualization.  She is noted disheveled, 
unkempt, and  with uncombed hair.   Education for hygiene and safety provided.   Patient 
verbalized understanding.  Initial  CIWA = 14 at 2001, CIWA=12 at 0000.  Latest CIWA=10 at 
0400: Patient presented with  anxiety, agitation, depression, irritability, nervousness, 
nasal congestion, tremors, restlessness, and fatigue.   VSWNL.  Respirations are even and 
unlabored.  Patient denies SOB, chest pain and cough.    Skin is intact, warm, and dry to 
touch.   Benadryl 50 mg PO administrated PRN for insomnia at 2107, PRN Clonidine 0.1 mg PO 
administrated for anxiety at 0120, and were effective.  Patient sleep 4 hours, Intake: 1,401 
ml, voided  x3.   Encouraged to intake fluids as tolerated.   Patient attended groups 
activities.   Safety and calm environment provided.   All needs met.  Safety measures: Call 
light within reach,  bed in the lowest position and locked, padded rails up x2.  Patient 
endorsed to day shift nurse.

## 2018-08-02 NOTE — NUR
PRN CLONIDINE PO RE-ASSESSMENT

Patient is sleeping.  RR 15.  Respirations even and unlabored.   PRN Clonidine 0.1 mg PO 
administrated for anxiety at 0120 was effective.  Safe and calm environment with minimized 
noises was provided.  All needs met.  Safety measures in the place: Call light within reach, 
bed locked in the lowest position, and padded rails up x2.  Will continue to monitor 
closely.

## 2018-08-02 NOTE — NUR
CIWA ASSESSMENT

Monitoring patient closely, noted presenting the following s/sx of withdrawal: tremors,  
diaphoretic, anxious, agitated, restless, fidgety, clammy skin,  difficulty concentrating, 
generalized discomfort, increased emotional amplitude, intermittent perspiration,with CIWA 
score of: 11. continues on 5 day Valium taper as ordered, encouraged patient to increase PO 
fluid intake as tolerated. Will continue to monitor.

## 2018-08-02 NOTE — NUR
CIWA ASSESSMENT:

CIWA=12 at 0000: Patient presented with anxiety, agitation, depression, nervousness,  
irritability,  tremors,  sweating,  nasal congestion,  fatigue, and restlessness.  Patient 
reports, "Already sleeping much better, anxiety still there, but way down".  The patient is 
noted easy overwhelmed, with anxious mood and  flat affect.  Relaxation Techniques: Deep 
breathing exercises, guided imagery, and  visualization.

## 2018-08-02 NOTE — NUR
PRN BENADRYL 50 MG 1 CAPSULE PO ADMINISTRATION

PRN Benadryl  50 mg PO administrated for insomnia as ordered.  Patient tolerated well.  Safe 
and calm environment with minimized noises was provided.  All needs met.  Safety measures in 
the place: Call light within reach, bed locked in the lowest position, padded rails up x2.  
Will continue to monitor closely.

## 2018-08-02 NOTE — NUR
END OF SHIFT

 Noted Disheveled, unkempt, unwashed/uncombed hair. Patient encouraged to maintain personal 
area and self groom. Patients affect was noted flat and labile. Noted with an 
anxious/depressed mood. Patient easily overwhelmed. Patient continues on Valium taper as 
ordered, today was day 4 of taper. Continues under close observation. Patient compliant with 
therapeutic plan of care. Patient presented with: the following s/sx of withdrawal: tremors, 
 diaphoretic, anxious, agitated, restless, fidgety, clammy skin,  difficulty concentrating, 
generalized discomfort, increased emotional amplitude, intermittent perspiration,with last, 
CIWA score of: 11.Encouraged to develop coping skills and utilization of non pharmacological 
interventions. Patient encouraged to attend group therapies/sessions to learn new coping 
skills to prevent relapse, noted attending and participating denies SI/HI. Encouraged 
increase PO fluid intake as tolerated. Patients safety measures are in place. Patient 
endorsed to night shift nurse, all pertinent information discussed.

## 2018-08-03 VITALS — DIASTOLIC BLOOD PRESSURE: 57 MMHG | SYSTOLIC BLOOD PRESSURE: 94 MMHG

## 2018-08-03 VITALS — SYSTOLIC BLOOD PRESSURE: 115 MMHG | DIASTOLIC BLOOD PRESSURE: 79 MMHG

## 2018-08-03 VITALS — DIASTOLIC BLOOD PRESSURE: 93 MMHG | SYSTOLIC BLOOD PRESSURE: 139 MMHG

## 2018-08-03 VITALS — DIASTOLIC BLOOD PRESSURE: 74 MMHG | SYSTOLIC BLOOD PRESSURE: 118 MMHG

## 2018-08-03 VITALS — DIASTOLIC BLOOD PRESSURE: 88 MMHG | SYSTOLIC BLOOD PRESSURE: 131 MMHG

## 2018-08-03 VITALS — SYSTOLIC BLOOD PRESSURE: 130 MMHG | DIASTOLIC BLOOD PRESSURE: 79 MMHG

## 2018-08-03 RX ADMIN — Medication SCH MG: at 08:36

## 2018-08-03 RX ADMIN — HYDROXYZINE PAMOATE PRN MG: 25 CAPSULE ORAL at 11:14

## 2018-08-03 RX ADMIN — THERA TABS SCH UDTAB: TAB at 08:36

## 2018-08-03 RX ADMIN — FOLIC ACID SCH MG: 1 TABLET ORAL at 08:36

## 2018-08-03 RX ADMIN — TRAZODONE HYDROCHLORIDE SCH MG: 50 TABLET ORAL at 21:46

## 2018-08-03 RX ADMIN — HYDROXYZINE PAMOATE PRN MG: 25 CAPSULE ORAL at 21:46

## 2018-08-03 RX ADMIN — PANTOPRAZOLE SODIUM SCH MG: 40 TABLET, DELAYED RELEASE ORAL at 06:56

## 2018-08-03 RX ADMIN — ALUMINUM HYDROXIDE, MAGNESIUM HYDROXIDE, AND SIMETHICONE PRN ML: 200; 200; 20 SUSPENSION ORAL at 11:14

## 2018-08-03 RX ADMIN — DICYCLOMINE HYDROCHLORIDE PRN MG: 20 TABLET ORAL at 21:46

## 2018-08-03 NOTE — NUR
BENADRYL RE-ASSESSMENT

The patient is sleeping.  RR: 16.  Respirations are even and unlabored.  PRN Benadryl  50 mg 
PO administrated at 2314 was effective.  Safe and calm environment with minimized noises was 
provided.  All needs met.  Safety measures in the place: Call light within reach, bed locked 
in the lowest position, and  padded rails up x2.  Will continue to monitor closely.

## 2018-08-03 NOTE — NUR
TYLENOL/VISTARIL/MYLANTA REASSESSMENT

Patient reports decrease in pain, current pain level 0/10. No further episode of heart burn, 
and feels less anxious, patient verbalized looking forward to attend and participating in 
yoga. No C/O chest pain at this time. Will continue to monitor.

## 2018-08-03 NOTE — NUR
CIWA ASSESSMENT 

Patient noted exhibiting the s/sx of withdrawal: tremors that can be felt but not seen, 
barely sweating, anxious, agitated, restless, fidgety, clammy skin,  difficulty 
concentrating, generalized discomfort, increased emotional amplitude, intermittent 
perspiration,with CIWA score of: 9. Will continue to monitor.

## 2018-08-03 NOTE — NUR
END OF SHIFT

 Patient alert and oriented x4, compliant with therapeutic plan of care. during shift was 
noted to present  the following s/sx of withdrawal: tremors,  diaphoretic, anxious, 
agitated, restless, fidgety, clammy skin,  difficulty concentrating, generalized discomfort, 
increased emotional amplitude, intermittent perspiration,with last, CIWA score of: 9. Noted 
with an anxious/depressed mood. Patient easily overwhelmed. Patient continues on Valium 
taper as ordered, today is day 5 of taper. Continues under close observation, patient 
received PRN: Tylenol, Vistaril and Mylanta at 1114, medications were effective. Patient 
with episode of reporting  on/off chest pain for the past 6 months, EKG done during shift, 
Dr. Velázquez notified of the results, NNO. currently patient denies any chest pain, monitored 
closely during shift.  Encouraged to develop coping skills and utilization of non 
pharmacological interventions. Patient encouraged to attend group therapies/sessions to 
learn new coping skills to prevent relapse, noted attending and participating denies SI/HI. 
Participated in Yoga during shift.  Encouraged increase PO fluid intake as tolerated. 
Patients safety measures are in place. Patient endorsed to night shift nurse, all pertinent 
information discussed.

## 2018-08-03 NOTE — NUR
CIWA ASSESSMENT.

CIWA=11 at 0000: Patient noted  with anxiety, agitation, nervousness,  irritability, flushed 
face, sweating, tremors, yawning,  restlessness, and fatigue.  The patient is noted easy 
overwhelmed, with anxious mood and  flat affect.   Relaxation Techniques: Deep breathing 
exercises, guided imagery, and  visualization.  Emotional support and reassuring provided.   
The patient encouraged to express his feelings.  Safe and calm environment with minimized 
noises was provided.   All needs met.   Safety measures: Call light within reach, bed is 
locked in lowest position, and padded bed rails up bilaterally.  Will continue to monitor 
closely.

## 2018-08-03 NOTE — NUR
START OF SHIFT NOTE:

Presented patient, 37 year old female, is on last day of 5 day Valium taper for 
Benzodiazepines(Xanax) and Alcohol(Beer) withdrawal.  Upon endorsement, patient is alert and 
oriented x4 with stable gait.  She is appears  worried,  with anxious mood and labile 
affect.  The most recent CIWA=9 at 1600:  During the day shift patient  presented with 
withdrawal symptoms such as anxiety, agitation, nervousness, tremors, sweating, clammy skin, 
generalized body aches , heartburn, restlessness, and fatigue.   PRN Vistaril 25 mg PO 
administrated for anxiety, PRN Tylenol 650 mg PO administrated for pain at 1114, PRN Maalox 
Suspension 30 ml/1 unit dose cup PO administrated for  heartburn at 1114, and were 
effective, per day shift nurse report.  Patient remains compliant with treatment, 
medications and diet regime.  Patient reports, "I am enjoying to attending groups 
activities, and spending time in Recreation Room.  Today, the panel talked about their 
experiences of being drunk ".  Encouraged to fluid intake as tolerated.  All needs met.  
Safety measures in place: Call light within reach, bed is locked in lowest position, padded 
bed rails up bilaterally.  Patient endorsed by day shift nurse.  Will continue to monitor 
closely.

## 2018-08-03 NOTE — NUR
CIWA ASSESSMENT 

Continues presenting  the following s/sx of withdrawal: tremors,  diaphoretic, anxious, 
agitated, restless, fidgety, clammy skin,  difficulty concentrating, generalized discomfort, 
increased emotional amplitude, intermittent perspiration,with CIWA score of: 12. denies any 
chest pain at this time, pending EKG, will continue to monitor.

## 2018-08-03 NOTE — NUR
CIWA ASSESSMENT

CIWA=10 at 0000. Patient presented  with anxiety, agitation, depression, irritability, 
fatigue, nervousness, very mild pins and needles sensations, and sweating.  The patient is 
anxious, irritated, easy overwhelmed, with poor eye contact.  Safe and calm environment with 
minimized noises was provided.   All needs met.  Safety measures on place.   Call light 
within reach, bed  locked in lowest position, padded rails up bilaterally.   Will continue 
to monitor closely.

-------------------------------------------------------------------------------

Addendum: 08/03/18 at 0144 by SHERYL LEVINE RN

-------------------------------------------------------------------------------

wrong note

## 2018-08-03 NOTE — NUR
MD COMMUNICATION/STAT EKG

Patient reported to Dr. Mccormick that she has been experiencing chest pain for the past 6 
months, the pain comes and goes. Patient currently complaining of left upper chest pain, 
denies any radiating pain, no dizziness noted. Patients BP: 136/85 HR: 77 O2 SAT: 100% RA R: 
17. Patient did verbalize feeling an increase in anxiety, and c/o heart burn. Per Dr. Bell stat orders for EKG were input into [facility]. Offered patient Mylanta and Vistaril. 
Will continue to monitor.

## 2018-08-03 NOTE — NUR
CIWA ASSESSMENT

CIWA=12 at 0400.  The patient c/o anxiety, agitation, irritability, clammy skin, stomach 
cramps, pins and needles sensations, myalgia, tremors, restlessness, fatigue, nervousness, 
sweating, nasal congestion, and yawning.  Safe and calm environment provided.   All needs 
met.  Safety measures in place: Call light within reach, bed locked in lowest position, and 
padded bed rails up bilaterally.  Will continue to monitor closely.

## 2018-08-03 NOTE — NUR
PRN BENADRYL 50 MG 1 CAPSULE  PO  AND  PRN BENTYL 20 MG 1 TABLET PO ADMINISTRATION.

PRN Benadryl 50 mg PO administrated for insomnia at 2146, PRN Bentyl 20 mg PO administrated 
for abdominal spasm at 2146, as ordered.  Patient tolerated well.  Safe and calm environment 
with minimized noises was provided.  All needs met.  Safety measures: Call light within 
reach, bed locked in the lowest position, and padded rails up x2.  Will continue to monitor 
closely.

## 2018-08-03 NOTE — NUR
CIWA ASSESSMENT

CIWA=9 at 2000: The  patient experienced withdrawal symptoms such as moderate anxiety, 
agitation, nervousness, tremors, that can be felt, not observe,  barely sweating, clammy 
skin, and restlessness. The patient  expressed her feelings  such as," I am easily 
irritated, and have restlessness".  Encouraged to express his feelings.  Reassuring  
provided.  Educated in Relaxation Techniques and Coping Skills.  Patient returning her 
knowledge back by verbalized understanding.  All needs met.  Safety measures in place: Call 
light within reach, bed is locked in lowest position, padded bed rails up x2.  Will continue 
to monitor closely.

## 2018-08-03 NOTE — NUR
BEGINNING OF SHIFT

Received patient in room with eyes closed, respirations are even and unlabored. Patient 
continues under close observation. Patient with admitting dx: etoh withdrawal, with ongoing 
5 day valium taper, patient is scheduled to begin day 5 of taper, will monitor closely. 
Patient endorsement report received from night shift nurse, all pertinent information was 
discussed. Patient received PRN: Trazodone, and Benadryl as per night shift medications 
effective. patient slept for 4 hours, per night shift nurse. Patient with last CIWA score 
of: 12. Patient received awake, alert and oriented x4, educated regarding plan of care for 
the day and medication regimen. safety measures are in place. call light with in reach. will 
continue to monitor closely.

## 2018-08-03 NOTE — NUR
PRN BENADRYL PO, PRN VISTARIL PO,  AND  PRN BENTYL  RE-ASSESSMENT

Patient is sleeping.  RR:15.  Respirations even and unlabored.  PRN Benadryl 50 mg PO 
administrated for insomnia at 2146, PRN Bentyl 20 mg PO administrated for abdominal spasm at 
2146, PRN Vistaril 25 mg PO administrated for anxiety at 2146, were  effective.   Safe and 
calm environment with minimized noises was provided.  All needs met.  Safety measures: Call 
light within reach, bed locked in the lowest position, and padded rails up x2.  Will 
continue to monitor closely.

## 2018-08-03 NOTE — NUR
END OF SHIFT NOTE

Presented patient is a 36 old female continues 5 day Valium taper ordered  for 
Benzodiazepines (Xanax) and Alcohol (Vodka) withdrawal.  Withdrawal symptoms were closely 
monitored.  Patient remains compliant with medications, treatment, and diet regimen.  The 
patient said that "medications alleviated my withdrawal symptoms".  Patient is alert and 
oriented x4, ambulatory with gait.  Her speech is soft and clear.  The patient is noted with 
anxious mood and  labile affect.  Patient reports, "I am feeling  irritability, easily  
excited, and rapid emotional changes".  Encouraged used Relaxation Techniques: Deep 
breathing exercises, guided imagery, and  visualization.  CIWA=13 at 2000, CIWA=11 at 0000.  
Last CIWA=12 at 0400: Patient presented with anxiety, agitation, depression, nervousness, 
emotional volatility, irritability, tremors, sweating, nasal congestion, fatigue, and 
restlessness.  Patient reports, "Already sleeping much better, anxiety still there, but way 
down".  PRN Benadryl 50 mg PO administrated for insomnia at 2324, and was effective.  
Patient slept for 4 hours, intake 1,500 ml, voided x3.  All needs met.   Safety measures in 
the place by hospital policy: Call light within reach, bed in the lowest position and 
locked, padded rails up x2.  Patient endorsed to day shift nurse in stable condition, report 
given.

## 2018-08-03 NOTE — NUR
PRN VISTARIL 25 MG 1 CAPSULE PO ADMINISTRATION.

PRN Vistarill 25 mg PO administrated for anxiety at 0009 as ordered.  Patient tolerated 
well.  Safe and calm environment with minimized noises was provided.  All needs met.  Safety 
measures: Call light within reach, bed locked in the lowest position, padded rails up x2.  
Will continue to monitor closely.

## 2018-08-04 VITALS — SYSTOLIC BLOOD PRESSURE: 123 MMHG | DIASTOLIC BLOOD PRESSURE: 67 MMHG

## 2018-08-04 VITALS — SYSTOLIC BLOOD PRESSURE: 127 MMHG | DIASTOLIC BLOOD PRESSURE: 86 MMHG

## 2018-08-04 VITALS — SYSTOLIC BLOOD PRESSURE: 111 MMHG | DIASTOLIC BLOOD PRESSURE: 71 MMHG

## 2018-08-04 VITALS — SYSTOLIC BLOOD PRESSURE: 93 MMHG | DIASTOLIC BLOOD PRESSURE: 60 MMHG

## 2018-08-04 RX ADMIN — FOLIC ACID SCH MG: 1 TABLET ORAL at 08:48

## 2018-08-04 RX ADMIN — Medication SCH MG: at 08:48

## 2018-08-04 RX ADMIN — PANTOPRAZOLE SODIUM SCH MG: 40 TABLET, DELAYED RELEASE ORAL at 06:47

## 2018-08-04 RX ADMIN — THERA TABS SCH UDTAB: TAB at 08:48

## 2018-08-04 NOTE — NUR
END OF SHIFT NOTE

Endorsed patient is completed 4 days from ordered 5 day Valium taper for Benzodiazepines 
(Xanax) and Alcohol (Vodka) withdrawal.  Withdrawal symptoms were closely monitored.  The 
patient scheduled for discharging today.  Patient is alert and oriented x4, ambulatory with 
steady gait, and soft and clear speech.  Irritated mood and anxious affect noted.  Patient 
reports, "I am feeling  irritability, easily  excited, and rapid emotional changes".  
Encouraged used Relaxation Techniques: Deep breathing exercises, guided imagery, and  
visualization CIWA=9 at 2000, CIWA=10 at 0000.  Last CIWA=9 at 0400: The patient  noted with 
anxiety, agitation, irritability, nervousness, tremors, that can be felt, not observe, 
sweating, and restlessness PRN Benadryl 50 mg PO administrated for insomnia at 2146, PRN 
Bentyl 20 mg PO administrated for abdominal spasm at 2146, PRN Vistaril 25 mg PO 
administrated for anxiety at 2146, and were  effective.  Patient remains compliant with 
treatment, medications and diet regime.  Patient attended group activities.  Patient 
reports, "I am enjoying to attending  groups activities,  and spending time in Recreation 
Room.  Patient slept for 6 hours, intake 1,500 ml, voided x6.  All needs met.  Safety 
measures: Call light within reach, bed in the lowest position and locked, padded rails up 
x2.  Patient endorsed to day shift nurse.

## 2018-08-04 NOTE — NUR
START OF SHIFT



Pt 36 y/o female admitted for etoh / benzo withdrawal. Received in room on bed with eyes 
closed resting, but easily arousable to name. Alert and oriented to name, place, and time. 
[place]. Skin warm and moist to touch. Respirations even and unlabored Bilateral hand tremors 
noted. Appears disheveled with hair uncombed. Empty drink bottles scattered throughout the 
room. Anxious and restless this morning. Irritable with pressured speech noted. Encouraged 
to maintain hygiene. It was reported that pt slept for 6 hours last night. Last ciwa=9 @ 
2000. Pt completed a 5 day valium taper. Bed on lowest position with side rails x2 up for 
safety. Call light within reach.

## 2018-08-04 NOTE — NUR
AMA



Pt 38 y/o female left AMA. Even after speaking with multiple staff and MD. Pt states she 
needs to go home to get ready for work and has to leave immediately. VS wnl. Denies any SI/ 
HI. All belongings packed in pt bag. Resource paper explained and given to pt.

## 2018-08-04 NOTE — NUR
CIWA ASSESSMENT

CIWA=10 at 0000: The  patient c/o anxiety, agitation, nervousness, tremors, that can be 
felt, not observe, barely sweating, and restlessness.  Safe and calm environment with 
minimized noises was provided.  All needs met.  Safety measures in place: Call light within 
reach, bed is locked in lowest position, and padded bed rails up X2.  Will continue to 
monitor closely.

## 2018-08-04 NOTE — NUR
CIWA ASSESSMENT

CIWA=9 at 0400: The  patient  noted with anxiety, agitation, irritability, nervousness, 
tremors, that can be felt, not observe,  sweating, and restlessness.  All needs met.  Safety 
measures in place: Call light within reach, bed is locked in lowest position, padded bed 
rails up x2.  Will continue to monitor closely.

## 2018-08-04 NOTE — NUR
CIWA ASSESSMENT



ciwa=8. Anxious and restless. Irritable. Not able to sit or lay still in bed. Pressured 
speech noted.

## 2018-08-04 NOTE — NUR
CIWA ASSESSMENT



ciwa=8. Anxious and restless. Irritable with pressured speech noted. Not able to sit/lay 
still on bed.

## 2021-07-20 NOTE — NUR
PRN TYLENOL/VISTARIL/MYLANTA

Patient administered Vistaril for increase anxiety, Mylanta for heart burn, and Tylenol for 
c/o pain 4/10. will monitor effectiveness of medication. no